# Patient Record
Sex: FEMALE | Race: WHITE | Employment: UNEMPLOYED | URBAN - METROPOLITAN AREA
[De-identification: names, ages, dates, MRNs, and addresses within clinical notes are randomized per-mention and may not be internally consistent; named-entity substitution may affect disease eponyms.]

---

## 2018-01-23 ENCOUNTER — OFFICE VISIT (OUTPATIENT)
Dept: FAMILY MEDICINE CLINIC | Age: 43
End: 2018-01-23

## 2018-01-23 VITALS
HEIGHT: 67 IN | DIASTOLIC BLOOD PRESSURE: 76 MMHG | OXYGEN SATURATION: 100 % | BODY MASS INDEX: 29.66 KG/M2 | TEMPERATURE: 98.5 F | SYSTOLIC BLOOD PRESSURE: 124 MMHG | RESPIRATION RATE: 16 BRPM | WEIGHT: 189 LBS | HEART RATE: 79 BPM

## 2018-01-23 DIAGNOSIS — F41.9 ANXIETY: ICD-10-CM

## 2018-01-23 DIAGNOSIS — R92.8 ABNORMAL MAMMOGRAM: Primary | ICD-10-CM

## 2018-01-23 RX ORDER — SUMATRIPTAN 50 MG/1
50 TABLET, FILM COATED ORAL
COMMUNITY

## 2018-01-23 RX ORDER — MELOXICAM 15 MG/1
15 TABLET ORAL DAILY
COMMUNITY

## 2018-01-23 RX ORDER — BUPROPION HYDROCHLORIDE 150 MG/1
150 TABLET ORAL
Qty: 30 TAB | Refills: 2 | Status: SHIPPED | OUTPATIENT
Start: 2018-01-23

## 2018-01-23 RX ORDER — DICLOFENAC SODIUM 10 MG/G
GEL TOPICAL 4 TIMES DAILY
COMMUNITY

## 2018-01-23 RX ORDER — ESCITALOPRAM OXALATE 20 MG/1
20 TABLET ORAL DAILY
COMMUNITY
End: 2018-01-23 | Stop reason: ALTCHOICE

## 2018-01-23 NOTE — MR AVS SNAPSHOT
[address] [place] [address] Road 
[phone] Patient: Sunshine Tuttle MRN: [id number] :1975 Visit Information Date & Time Provider Department Dept. Phone Encounter #  
 2018 10:30 AM Watson Snider MD [address] [id number] Follow-up Instructions Return in about 2 months (around 3/23/2018). Your Appointments 2018 10:30 AM  
New Patient with Watson Snider MD  
[facility] [address]) Appt Note: Establish Care/Swollen Uvula [facility] [address] [place] [address]  
[phone]  
  
   
 [facility] [address] [place] [address] Upcoming Health Maintenance Date Due DTaP/Tdap/Td series (1 - Tdap) 10/6/1998 PAP AKA CERVICAL CYTOLOGY 10/6/1998 Influenza Age 5 to Adult 2017 Allergies as of 2018  Review Complete On: 2018 By: Watson Snider MD  
 No Known Allergies Current Immunizations  Never Reviewed No immunizations on file. Not reviewed this visit You Were Diagnosed With   
  
 Codes Comments Abnormal mammogram    -  Primary ICD-10-CM: R92.8 ICD-9-CM: 793.80 Anxiety     ICD-10-CM: F41.9 ICD-9-CM: 300.00 Vitals BP Pulse Temp Resp Height(growth percentile) Weight(growth percentile) 124/76 79 98.5 °F (36.9 °C) 16 5' 7\" (1.702 m) 189 lb (85.7 kg) LMP SpO2 BMI OB Status Smoking Status 01/15/2018 100% 29.6 kg/m2 Having regular periods Never Smoker Vitals History BMI and BSA Data Body Mass Index Body Surface Area  
 29.6 kg/m 2 2.01 m 2 Your Updated Medication List  
  
   
This list is accurate as of: 18 10:29 AM.  Always use your most recent med list.  
  
  
  
  
 buPROPion  mg tablet Commonly known as:  Bao Copeland Take 1 Tab by mouth every morning. IMITREX 50 mg tablet Generic drug:  SUMAtriptan Take 50 mg by mouth once as needed for Migraine. meloxicam 15 mg tablet Commonly known as:  MOBIC Take 15 mg by mouth daily. VOLTAREN 1 % Gel Generic drug:  diclofenac Apply  to affected area four (4) times daily. Prescriptions Printed Refills buPROPion XL (WELLBUTRIN XL) 150 mg tablet 2 Sig: Take 1 Tab by mouth every morning. Class: Print Route: Oral  
  
We Performed the Following REFERRAL TO PSYCHIATRY [REF91 Custom] Comments:  
 Please evaluate patient for ADD and anxiety Follow-up Instructions Return in about 2 months (around 3/23/2018). To-Do List   
 01/23/2018 Imaging:  [facility] MAMMO BI DX INCL CAD Referral Information Referral ID Referred By Referred To  
  
 [id number] JUNAID Cyr Not Available Visits Status Start Date End Date 1 New Request 1/23/18 1/23/19 If your referral has a status of pending review or denied, additional information will be sent to support the outcome of this decision. Introducing \[facility]\"" & HEALTH SERVICES! [facility] introduces [employer] patient portal. Now you can access parts of your medical record, email your doctor's office, and request medication refills online. 1. In your internet browser, go to [email or web address] 2. Click on the First Time User? Click Here link in the Sign In box. You will see the New Member Sign Up page. 3. Enter your [employer] Access Code exactly as it appears below. You will not need to use this code after youve completed the sign-up process. If you do not sign up before the expiration date, you must request a new code. · [employer] Access Code: [email or web address] Expires: 4/23/2018  9:46 AM 
 
4. Enter the last four digits of your Social Security Number (xxxx) and Date of Birth (mm/dd/yyyy) as indicated and click Submit. You will be taken to the next sign-up page. 5. Create a Familio ID. This will be your Familio login ID and cannot be changed, so think of one that is secure and easy to remember. 6. Create a Familio password. You can change your password at any time. 7. Enter your Password Reset Question and Answer. This can be used at a later time if you forget your password. 8. Enter your e-mail address. You will receive e-mail notification when new information is available in [address]. 9. Click Sign Up. You can now view and download portions of your medical record. 10. Click the Download Summary menu link to download a portable copy of your medical information. If you have questions, please visit the Frequently Asked Questions section of the [employer] website. Remember, [employer] is NOT to be used for urgent needs. For medical emergencies, dial 911. Now available from your iPhone and Android! Please provide this summary of care documentation to your next provider. Your primary care clinician is listed as [id number] [facility]. If you have any questions after today's visit, please call [phone].

## 2018-01-23 NOTE — PROGRESS NOTES
Lynne Barnes is a 38 y.o. female  presents for establish care. Needs mammogram.    No Known Allergies  Outpatient Prescriptions Marked as Taking for the 1/23/18 encounter (Office Visit) with Chantel Mckeon MD   Medication Sig Dispense Refill    escitalopram oxalate (LEXAPRO) 20 mg tablet Take 20 mg by mouth daily.  SUMAtriptan (IMITREX) 50 mg tablet Take 50 mg by mouth once as needed for Migraine.  diclofenac (VOLTAREN) 1 % gel Apply  to affected area four (4) times daily.  meloxicam (MOBIC) 15 mg tablet Take 15 mg by mouth daily. There is no problem list on file for this patient. Past Medical History:   Diagnosis Date    ADHD     Anxiety     Back pain     Depression     Migraine     Neck pain      Social History     Social History    Marital status:      Spouse name: N/A    Number of children: N/A    Years of education: N/A     Social History Main Topics    Smoking status: Never Smoker    Smokeless tobacco: Never Used    Alcohol use Yes      Comment: 'rarely'    Drug use: No    Sexual activity: Yes     Other Topics Concern    Not on file     Social History Narrative    No narrative on file     Family History   Problem Relation Age of Onset    Sleep Apnea Mother     Heart Disease Mother     High Cholesterol Mother     Muscular dystrophy Father     High Cholesterol Father     Diabetes Other         Review of Systems   Constitutional: Negative for chills and fever. Respiratory: Negative for cough. Cardiovascular: Negative for chest pain and palpitations. Gastrointestinal: Negative for nausea and vomiting. Genitourinary: Negative.       Vitals:    01/23/18 1007   BP: 124/76   Pulse: 79   Resp: 16   Temp: 98.5 °F (36.9 °C)   SpO2: 100%   Weight: 189 lb (85.7 kg)   Height: 5' 7\" (1.702 m)   PainSc:   0 - No pain   LMP: 01/15/2018       Physical Exam   Constitutional: She is oriented to person, place, and time and well-developed, well-nourished, and in no distress. Eyes: Conjunctivae are normal. Pupils are equal, round, and reactive to light. Neck: Normal range of motion. Neck supple. Cardiovascular: Normal rate, regular rhythm and normal heart sounds. Pulmonary/Chest: Effort normal and breath sounds normal.   Musculoskeletal: Normal range of motion. Neurological: She is alert and oriented to person, place, and time. Gait normal.   Skin: Skin is warm and dry. Psychiatric: Mood, memory, affect and judgment normal.   Nursing note and vitals reviewed. Assessment/Plan      ICD-10-CM ICD-9-CM    1. Abnormal mammogram R92.8 793.80 [facility] MAMMO BI DX INCL CAD   2. Anxiety F41.9 300.00 buPROPion XL (WELLBUTRIN XL) 150 mg tablet      REFERRAL TO PSYCHIATRY     I have discussed the diagnosis with the patient and the intended plan of care as seen in the above orders. The patient has received an after-visit summary and questions were answered concerning future plans. I have discussed medication, side effects, and warnings with the patient in detail. The patient verbalized understanding and is in agreement with the plan of care. The patient will contact the office with any additional concerns. Follow-up Disposition:  Return in about 2 months (around 3/23/2018).   lab results and schedule of future lab studies reviewed with patient      Mitra Sandoval MD

## 2018-01-23 NOTE — PROGRESS NOTES
Chief Complaint   Patient presents with   [address]    Other     hx swollen uvula      Pt has had katerin bar virus panel done. Needs referral for mammo.

## 2018-02-08 ENCOUNTER — OFFICE VISIT (OUTPATIENT)
Dept: FAMILY MEDICINE CLINIC | Age: 43
End: 2018-02-08

## 2018-02-08 VITALS
WEIGHT: 187 LBS | OXYGEN SATURATION: 98 % | DIASTOLIC BLOOD PRESSURE: 73 MMHG | SYSTOLIC BLOOD PRESSURE: 124 MMHG | HEART RATE: 97 BPM | HEIGHT: 67 IN | RESPIRATION RATE: 16 BRPM | BODY MASS INDEX: 29.35 KG/M2

## 2018-02-08 DIAGNOSIS — K12.2 UVULITIS: Primary | ICD-10-CM

## 2018-02-08 NOTE — PROGRESS NOTES
Chief Complaint   Patient presents with    Gland Swelling     c/o uvula is still swollen and feels like it's getting worse    Ear Pain     right ear pain      1. Have you been to the ER, urgent care clinic since your last visit? Hospitalized since your last visit? No    2. Have you seen or consulted any other health care providers outside of the [address] [place] [address] since your last visit? Include any pap smears or colon screening.  No

## 2018-02-08 NOTE — PROGRESS NOTES
Tamra Joe is a 38 y.o. female  presents for follow up of feeling like mass is in throat. She has had sxs for few weeks. No change in sxs. No fever or sinus drainage. No Known Allergies  Outpatient Prescriptions Marked as Taking for the 2/8/18 encounter (Office Visit) with Tito Tripathi MD   Medication Sig Dispense Refill    SUMAtriptan (IMITREX) 50 mg tablet Take 50 mg by mouth once as needed for Migraine.  diclofenac (VOLTAREN) 1 % gel Apply  to affected area four (4) times daily.  meloxicam (MOBIC) 15 mg tablet Take 15 mg by mouth daily.  buPROPion XL (WELLBUTRIN XL) 150 mg tablet Take 1 Tab by mouth every morning. 30 Tab 2     There is no problem list on file for this patient. Past Medical History:   Diagnosis Date    ADHD     Anxiety     Back pain     Depression     Migraine     Neck pain      Social History     Social History    Marital status:      Spouse name: N/A    Number of children: N/A    Years of education: N/A     Social History Main Topics    Smoking status: Never Smoker    Smokeless tobacco: Never Used    Alcohol use Yes      Comment: 'rarely'    Drug use: No    Sexual activity: Yes     Other Topics Concern    None     Social History Narrative     Family History   Problem Relation Age of Onset    Sleep Apnea Mother     Heart Disease Mother     High Cholesterol Mother     Muscular dystrophy Father     High Cholesterol Father     Diabetes Other         Review of Systems   Constitutional: Negative for chills and fever. HENT: Negative for congestion, hearing loss and nosebleeds. Respiratory: Negative for cough and shortness of breath. Cardiovascular: Negative for chest pain. Gastrointestinal: Negative for diarrhea, nausea and vomiting. Skin: Negative for rash.      Vitals:    02/08/18 0931   BP: 124/73   Pulse: 97   Resp: 16   SpO2: 98%   Weight: 187 lb (84.8 kg)   Height: 5' 7\" (1.702 m)   LMP: 01/15/2018       Physical Exam Constitutional: She is well-developed, well-nourished, and in no distress. Cardiovascular: Normal rate, regular rhythm and normal heart sounds. Pulmonary/Chest: Effort normal and breath sounds normal.   Neurological: She is alert. Skin: Skin is warm and dry. Nursing note and vitals reviewed. Assessment/Plan      ICD-10-CM ICD-9-CM    1. Uvulitis K12.2 528.3 REFERRAL TO ENT-OTOLARYNGOLOGY     I have discussed the diagnosis with the patient and the intended plan of care as seen in the above orders. The patient has received an after-visit summary and questions were answered concerning future plans. I have discussed medication, side effects, and warnings with the patient in detail. The patient verbalized understanding and is in agreement with the plan of care. The patient will contact the office with any additional concerns. Follow-up Disposition:  Return if symptoms worsen or fail to improve.     Delphine Magallon MD

## 2018-02-08 NOTE — MR AVS SNAPSHOT
[facility] [address] [place] [address] 
[phone] Patient: Ursula Duff MRN: [id number] :1975 Visit Information Date & Time Provider Department Dept. Phone Encounter #  
 2018  9:30 AM Samir Waller MD [address] [id number] Follow-up Instructions Return if symptoms worsen or fail to improve. Upcoming Health Maintenance Date Due  
 PAP AKA CERVICAL CYTOLOGY 10/6/1998 DTaP/Tdap/Td series (2 - Td) 2028 Allergies as of 2018  Review Complete On: 2018 By: Jagdish Chisholm LPN No Known Allergies Current Immunizations  Never Reviewed No immunizations on file. Not reviewed this visit You Were Diagnosed With   
  
 Codes Comments Uvulitis    -  Primary ICD-10-CM: K12.2 ICD-9-CM: 788. 3 Vitals BP Pulse Resp Height(growth percentile) Weight(growth percentile) LMP  
 124/73 97 16 5' 7\" (1.702 m) 187 lb (84.8 kg) 01/15/2018 SpO2 BMI OB Status Smoking Status 98% 29.29 kg/m2 Having regular periods Never Smoker BMI and BSA Data Body Mass Index Body Surface Area  
 29.29 kg/m 2 2 m 2 Your Updated Medication List  
  
   
This list is accurate as of: 18  9:41 AM.  Always use your most recent med list.  
  
  
  
  
 buPROPion  mg tablet Commonly known as:  Gabriela Mast Take 1 Tab by mouth every morning. IMITREX 50 mg tablet Generic drug:  SUMAtriptan Take 50 mg by mouth once as needed for Migraine. meloxicam 15 mg tablet Commonly known as:  MOBIC Take 15 mg by mouth daily. VOLTAREN 1 % Gel Generic drug:  diclofenac Apply  to affected area four (4) times daily. We Performed the Following REFERRAL TO ENT-OTOLARYNGOLOGY [YBR65 Custom] Comments:  
 Please evaluate patient for persistent possible throat mass. Follow-up Instructions Return if symptoms worsen or fail to improve. Referral Information Referral ID Referred By Referred To  
  
 [id number] JUNAID Julian Ma Not Available Visits Status Start Date End Date 1 New Request 2/8/18 2/8/19 If your referral has a status of pending review or denied, additional information will be sent to support the outcome of this decision. Patient Instructions Uvulitis: Care Instructions Your Care Instructions Uvulitis (say \"gaz-bzgo-MT-tus\") is an inflammation of the uvula (say \"QXH-wuqq-nte\"). This is the small piece of finger-shaped tissue that hangs down in the back of the throat. Uvulitis is most often caused by an infection. It can also be a reaction to an allergy or injury. Often the cause is not known. Your uvula may be red and swollen. You may feel like something is stuck at the back of your throat. Sometimes you may have a hard time swallowing. You may have a sore throat or a fever. Home treatment for a sore throat may be all that is needed to relieve uvulitis. If it is caused by an infection, your doctor may give you an antibiotic. Your doctor may prescribe an antihistamine or a steroid medicine if uvulitis is caused by an allergy. It may also go away without treatment. Follow-up care is a key part of your treatment and safety. Be sure to make and go to all appointments, and call your doctor if you are having problems. It's also a good idea to know your test results and keep a list of the medicines you take. How can you care for yourself at home? · Be safe with medicines. Take your medicines exactly as prescribed. Call your doctor if you think you are having a problem with your medicine. You will get more details on the specific medicines your doctor prescribes. · Gargle with warm salt water once an hour to help reduce swelling and relieve pain. Use 1 teaspoon of salt mixed in 1 cup of warm water. · Try an over-the-counter throat spray to relieve throat pain. · Ask your doctor if you can take an over-the-counter pain medicine, such as acetaminophen (Tylenol), ibuprofen (Advil, Motrin), or naproxen (Aleve). Read and follow all instructions on the label. · Drink plenty of fluids. Fluids may help soothe your throat. If you have kidney, heart, or liver disease and have to limit fluids, talk with your doctor before you increase the amount of fluids you drink. · Do not smoke or allow others to smoke around you. Smoking can make your throat problem worse. If you need help quitting, talk to your doctor about stop-smoking programs and medicines. These can increase your chances of quitting for good. When should you call for help? Call your doctor now or seek immediate medical care if: 
? · You have new or worse symptoms of infection, such as: 
¨ Increased pain, swelling, warmth, or redness. ¨ Red streaks leading from the area. ¨ Pus draining from the area. ¨ A fever. ? · You have new pain, or your pain gets worse. ? · You have new or worse trouble swallowing. ? · You seem to be getting sicker. ? · You have shortness of breath. ? Watch closely for changes in your health, and be sure to contact your doctor if: 
? · You do not get better as expected. Where can you learn more? Go to http://kerri-kulwant.info/. Enter M157 in the search box to learn more about \"Uvulitis: Care Instructions. \" Current as of: May 12, 2017 Content Version: 11.4 © 9187-1936 [employer]. Care instructions adapted under license by [employer] [place] (which disclaims liability or warranty for this information). If you have questions about a medical condition or this instruction, always ask your healthcare professional. [address] any warranty or liability for your use of this information. Introducing [facility] & HEALTH SERVICES! Dear Michael Berman: Thank you for requesting a [employer] account. Our records indicate that you already have an active [employer] account. You can access your account anytime at [email or web address] Did you know that you can access your hospital and ER discharge instructions at any time in [employer]? You can also review all of your test results from your hospital stay or ER visit. Additional Information If you have questions, please visit the Frequently Asked Questions section of the [employer] website at [email or web address] Remember, [employer] is NOT to be used for urgent needs. For medical emergencies, dial 911. Now available from your iPhone and Android! Please provide this summary of care documentation to your next provider. Your primary care clinician is listed as [address]. If you have any questions after today's visit, please call [phone].

## 2018-04-20 ENCOUNTER — HOSPITAL ENCOUNTER (OUTPATIENT)
Dept: MAMMOGRAPHY | Age: 43
Discharge: HOME | End: 2018-04-20
Attending: FAMILY MEDICINE
Payer: COMMERCIAL

## 2018-04-20 ENCOUNTER — HOSPITAL ENCOUNTER (OUTPATIENT)
Dept: ULTRASOUND IMAGING | Age: 43
Discharge: HOME | End: 2018-04-20
Attending: FAMILY MEDICINE
Payer: COMMERCIAL

## 2018-04-20 DIAGNOSIS — N63.0 BREAST LUMP: ICD-10-CM

## 2018-04-20 DIAGNOSIS — N64.4 BREAST PAIN: ICD-10-CM

## 2018-04-20 DIAGNOSIS — R92.8 ABNORMAL MAMMOGRAM: ICD-10-CM

## 2018-04-20 PROCEDURE — 77062 BREAST TOMOSYNTHESIS BI: CPT

## 2018-04-20 PROCEDURE — 76642 ULTRASOUND BREAST LIMITED: CPT

## 2018-05-07 ENCOUNTER — TELEPHONE (OUTPATIENT)
Dept: FAMILY MEDICINE CLINIC | Age: 43
End: 2018-05-07

## 2018-05-07 NOTE — TELEPHONE ENCOUNTER
Notes Recorded by Osbaldo Hill MD on 4/24/2018 at 10:29 AM  mammo no cancer.  Does have lipoma left breast.      Pt received result letter in mail. She was given above message. She asked how often she should have her mammo done. I let her now per the note:  Recommend routine annual screening mammogram  -The patient was asked to examine the area of concern on a monthly basis and to  return sooner if any changes occur. Pt aware to call if she begins to experience any breast changes or have any concerns. Pt expressed clear understanding and had no further questions.

## 2018-06-09 ENCOUNTER — HOSPITAL ENCOUNTER (EMERGENCY)
Age: 43
Discharge: HOME | End: 2018-06-09
Attending: EMERGENCY MEDICINE
Payer: COMMERCIAL

## 2018-06-09 ENCOUNTER — APPOINTMENT (OUTPATIENT)
Dept: CT IMAGING | Age: 43
End: 2018-06-09
Attending: EMERGENCY MEDICINE
Payer: COMMERCIAL

## 2018-06-09 VITALS
HEIGHT: 66 IN | RESPIRATION RATE: 20 BRPM | DIASTOLIC BLOOD PRESSURE: 88 MMHG | HEART RATE: 67 BPM | OXYGEN SATURATION: 98 % | BODY MASS INDEX: 28.93 KG/M2 | TEMPERATURE: 98.3 F | WEIGHT: 180 LBS | SYSTOLIC BLOOD PRESSURE: 139 MMHG

## 2018-06-09 DIAGNOSIS — N20.0 KIDNEY STONE: ICD-10-CM

## 2018-06-09 DIAGNOSIS — R30.0 DYSURIA: ICD-10-CM

## 2018-06-09 DIAGNOSIS — R10.9 ACUTE RIGHT FLANK PAIN: Primary | ICD-10-CM

## 2018-06-09 LAB
ANION GAP SERPL CALC-SCNC: 5 MMOL/L (ref 3–18)
APPEARANCE UR: CLEAR
BACTERIA URNS QL MICRO: ABNORMAL /HPF
BASOPHILS # BLD: 0 K/UL (ref 0–0.06)
BASOPHILS NFR BLD: 1 % (ref 0–2)
BILIRUB UR QL: ABNORMAL
BUN SERPL-MCNC: 16 MG/DL (ref 7–18)
BUN/CREAT SERPL: 17 (ref 12–20)
CALCIUM SERPL-MCNC: 9.2 MG/DL (ref 8.5–10.1)
CHLORIDE SERPL-SCNC: 103 MMOL/L (ref 100–108)
CO2 SERPL-SCNC: 29 MMOL/L (ref 21–32)
COLOR UR: ABNORMAL
CREAT SERPL-MCNC: 0.93 MG/DL (ref 0.6–1.3)
DIFFERENTIAL METHOD BLD: ABNORMAL
EOSINOPHIL # BLD: 0.1 K/UL (ref 0–0.4)
EOSINOPHIL NFR BLD: 2 % (ref 0–5)
EPITH CASTS URNS QL MICRO: ABNORMAL /LPF (ref 0–5)
ERYTHROCYTE [DISTWIDTH] IN BLOOD BY AUTOMATED COUNT: 12.8 % (ref 11.6–14.5)
GLUCOSE SERPL-MCNC: 96 MG/DL (ref 74–99)
GLUCOSE UR STRIP.AUTO-MCNC: NEGATIVE MG/DL
HCG UR QL: NEGATIVE
HCT VFR BLD AUTO: 37.1 % (ref 35–45)
HGB BLD-MCNC: 12.3 G/DL (ref 12–16)
HGB UR QL STRIP: ABNORMAL
KETONES UR QL STRIP.AUTO: NEGATIVE MG/DL
LEUKOCYTE ESTERASE UR QL STRIP.AUTO: ABNORMAL
LYMPHOCYTES # BLD: 1.7 K/UL (ref 0.9–3.6)
LYMPHOCYTES NFR BLD: 31 % (ref 21–52)
MCH RBC QN AUTO: 29.6 PG (ref 24–34)
MCHC RBC AUTO-ENTMCNC: 33.2 G/DL (ref 31–37)
MCV RBC AUTO: 89.2 FL (ref 74–97)
MONOCYTES # BLD: 0.4 K/UL (ref 0.05–1.2)
MONOCYTES NFR BLD: 8 % (ref 3–10)
MUCOUS THREADS URNS QL MICRO: ABNORMAL /LPF
NEUTS SEG # BLD: 3.1 K/UL (ref 1.8–8)
NEUTS SEG NFR BLD: 58 % (ref 40–73)
NITRITE UR QL STRIP.AUTO: POSITIVE
PH UR STRIP: 6.5 [PH] (ref 5–8)
PLATELET # BLD AUTO: 264 K/UL (ref 135–420)
PMV BLD AUTO: 10.3 FL (ref 9.2–11.8)
POTASSIUM SERPL-SCNC: 4.2 MMOL/L (ref 3.5–5.5)
PROT UR STRIP-MCNC: ABNORMAL MG/DL
RBC # BLD AUTO: 4.16 M/UL (ref 4.2–5.3)
RBC #/AREA URNS HPF: ABNORMAL /HPF (ref 0–5)
SODIUM SERPL-SCNC: 137 MMOL/L (ref 136–145)
SP GR UR REFRACTOMETRY: 1.03 (ref 1–1.03)
UROBILINOGEN UR QL STRIP.AUTO: 1 EU/DL (ref 0.2–1)
WBC # BLD AUTO: 5.4 K/UL (ref 4.6–13.2)
WBC URNS QL MICRO: ABNORMAL /HPF (ref 0–4)

## 2018-06-09 PROCEDURE — 96375 TX/PRO/DX INJ NEW DRUG ADDON: CPT

## 2018-06-09 PROCEDURE — 74176 CT ABD & PELVIS W/O CONTRAST: CPT

## 2018-06-09 PROCEDURE — 87086 URINE CULTURE/COLONY COUNT: CPT | Performed by: EMERGENCY MEDICINE

## 2018-06-09 PROCEDURE — 80048 BASIC METABOLIC PNL TOTAL CA: CPT | Performed by: EMERGENCY MEDICINE

## 2018-06-09 PROCEDURE — 74011250636 HC RX REV CODE- 250/636: Performed by: EMERGENCY MEDICINE

## 2018-06-09 PROCEDURE — 96374 THER/PROPH/DIAG INJ IV PUSH: CPT

## 2018-06-09 PROCEDURE — 99283 EMERGENCY DEPT VISIT LOW MDM: CPT

## 2018-06-09 PROCEDURE — 81025 URINE PREGNANCY TEST: CPT | Performed by: EMERGENCY MEDICINE

## 2018-06-09 PROCEDURE — 96361 HYDRATE IV INFUSION ADD-ON: CPT

## 2018-06-09 PROCEDURE — 74011000250 HC RX REV CODE- 250: Performed by: EMERGENCY MEDICINE

## 2018-06-09 PROCEDURE — 81001 URINALYSIS AUTO W/SCOPE: CPT | Performed by: EMERGENCY MEDICINE

## 2018-06-09 PROCEDURE — 85025 COMPLETE CBC W/AUTO DIFF WBC: CPT | Performed by: EMERGENCY MEDICINE

## 2018-06-09 RX ORDER — SULFAMETHOXAZOLE AND TRIMETHOPRIM 800; 160 MG/1; MG/1
1 TABLET ORAL 2 TIMES DAILY
Qty: 14 TAB | Refills: 0 | Status: SHIPPED | OUTPATIENT
Start: 2018-06-09 | End: 2018-06-16

## 2018-06-09 RX ORDER — PHENAZOPYRIDINE HYDROCHLORIDE 100 MG/1
100 TABLET, FILM COATED ORAL
Qty: 6 TAB | Refills: 0 | Status: SHIPPED | OUTPATIENT
Start: 2018-06-09 | End: 2018-06-11

## 2018-06-09 RX ORDER — KETOROLAC TROMETHAMINE 30 MG/ML
30 INJECTION, SOLUTION INTRAMUSCULAR; INTRAVENOUS
Status: COMPLETED | OUTPATIENT
Start: 2018-06-09 | End: 2018-06-09

## 2018-06-09 RX ADMIN — KETOROLAC TROMETHAMINE 30 MG: 30 INJECTION INTRAMUSCULAR; INTRAVENOUS at 11:12

## 2018-06-09 RX ADMIN — WATER 1 G: 1 INJECTION INTRAMUSCULAR; INTRAVENOUS; SUBCUTANEOUS at 11:16

## 2018-06-09 RX ADMIN — SODIUM CHLORIDE 500 ML: 900 INJECTION, SOLUTION INTRAVENOUS at 11:06

## 2018-06-09 NOTE — ED NOTES
Pt states she feels better.  What appeared to be a small stone was noted in her urine that obtained and sent to the lab

## 2018-06-09 NOTE — ED NOTES
Patient armband removed and shredded  Pt discharged home with after care instructions given to pt . Pt verbalizes understand of after care instructions. Return to the ED for any worsening symptoms and follow with primary care doctor as directed. IV removed site without redness or swelling.    Ambrocio Mae RN

## 2018-06-09 NOTE — DISCHARGE INSTRUCTIONS
Kidney Stone: Care Instructions  Your Care Instructions    Kidney stones are formed when salts, minerals, and other substances normally found in the urine clump together. They can be as small as grains of sand or, rarely, as large as golf balls. While the stone is traveling through the ureter, which is the tube that carries urine from the kidney to the bladder, you will probably feel pain. The pain may be mild or very severe. You may also have some blood in your urine. As soon as the stone reaches the bladder, any intense pain should go away. If a stone is too large to pass on its own, you may need a medical procedure to help you pass the stone. The doctor has checked you carefully, but problems can develop later. If you notice any problems or new symptoms, get medical treatment right away. Follow-up care is a key part of your treatment and safety. Be sure to make and go to all appointments, and call your doctor if you are having problems. It's also a good idea to know your test results and keep a list of the medicines you take. How can you care for yourself at home? · Drink plenty of fluids, enough so that your urine is light yellow or clear like water. If you have kidney, heart, or liver disease and have to limit fluids, talk with your doctor before you increase the amount of fluids you drink. · Take pain medicines exactly as directed. Call your doctor if you think you are having a problem with your medicine. ¨ If the doctor gave you a prescription medicine for pain, take it as prescribed. ¨ If you are not taking a prescription pain medicine, ask your doctor if you can take an over-the-counter medicine. Read and follow all instructions on the label. · Your doctor may ask you to strain your urine so that you can collect your kidney stone when it passes. You can use a kitchen strainer or a tea strainer to catch the stone. Store it in a plastic bag until you see your doctor again.   Preventing future kidney stones  Some changes in your diet may help prevent kidney stones. Depending on the cause of your stones, your doctor may recommend that you:  · Drink plenty of fluids, enough so that your urine is light yellow or clear like water. If you have kidney, heart, or liver disease and have to limit fluids, talk with your doctor before you increase the amount of fluids you drink. · Limit coffee, tea, and alcohol. Also avoid grapefruit juice. · Do not take more than the recommended daily dose of vitamins C and D.  · Avoid antacids such as Gaviscon, Maalox, Mylanta, or Tums. · Limit the amount of salt (sodium) in your diet. · Eat a balanced diet that is not too high in protein. · Limit foods that are high in a substance called oxalate, which can cause kidney stones. These foods include dark green vegetables, rhubarb, chocolate, wheat bran, nuts, cranberries, and beans. When should you call for help? Call your doctor now or seek immediate medical care if:  ? · You cannot keep down fluids. ? · Your pain gets worse. ? · You have a fever or chills. ? · You have new or worse pain in your back just below your rib cage (the flank area). ? · You have new or more blood in your urine. ? Watch closely for changes in your health, and be sure to contact your doctor if:  ? · You do not get better as expected. Where can you learn more? Go to http://kerri-kulwant.info/. Enter V753 in the search box to learn more about \"Kidney Stone: Care Instructions. \"  Current as of: May 12, 2017  Content Version: 11.4  © 9433-1472 [employer]. Care instructions adapted under license by [employer] (which disclaims liability or warranty for this information). If you have questions about a medical condition or this instruction, always ask your healthcare professional. [address] any warranty or liability for your use of this information.          Abdominal Pain: Care Instructions  Your Care Instructions    Abdominal pain has many possible causes. Some aren't serious and get better on their own in a few days. Others need more testing and treatment. If your pain continues or gets worse, you need to be rechecked and may need more tests to find out what is wrong. You may need surgery to correct the problem. Don't ignore new symptoms, such as fever, nausea and vomiting, urination problems, pain that gets worse, and dizziness. These may be signs of a more serious problem. Your doctor may have recommended a follow-up visit in the next 8 to 12 hours. If you are not getting better, you may need more tests or treatment. The doctor has checked you carefully, but problems can develop later. If you notice any problems or new symptoms, get medical treatment right away. Follow-up care is a key part of your treatment and safety. Be sure to make and go to all appointments, and call your doctor if you are having problems. It's also a good idea to know your test results and keep a list of the medicines you take. How can you care for yourself at home? · Rest until you feel better. · To prevent dehydration, drink plenty of fluids, enough so that your urine is light yellow or clear like water. Choose water and other caffeine-free clear liquids until you feel better. If you have kidney, heart, or liver disease and have to limit fluids, talk with your doctor before you increase the amount of fluids you drink. · If your stomach is upset, eat mild foods, such as rice, dry toast or crackers, bananas, and applesauce. Try eating several small meals instead of two or three large ones. · Wait until 48 hours after all symptoms have gone away before you have spicy foods, alcohol, and drinks that contain caffeine. · Do not eat foods that are high in fat. · Avoid anti-inflammatory medicines such as aspirin, ibuprofen (Advil, Motrin), and naproxen (Aleve). These can cause stomach upset.  Talk to your doctor if you take daily aspirin for another health problem. When should you call for help? Call 911 anytime you think you may need emergency care. For example, call if:  ? · You passed out (lost consciousness). ? · You pass maroon or very bloody stools. ? · You vomit blood or what looks like coffee grounds. ? · You have new, severe belly pain. ?Call your doctor now or seek immediate medical care if:  ? · Your pain gets worse, especially if it becomes focused in one area of your belly. ? · You have a new or higher fever. ? · Your stools are black and look like tar, or they have streaks of blood. ? · You have unexpected vaginal bleeding. ? · You have symptoms of a urinary tract infection. These may include:  ¨ Pain when you urinate. ¨ Urinating more often than usual.  ¨ Blood in your urine. ? · You are dizzy or lightheaded, or you feel like you may faint. ? Watch closely for changes in your health, and be sure to contact your doctor if:  ? · You are not getting better after 1 day (24 hours). Where can you learn more? Go to http://kerri-kulwant.info/. Enter L907 in the search box to learn more about \"Abdominal Pain: Care Instructions. \"  Current as of: March 20, 2017  Content Version: 11.4  © 8841-4917 [employer]. Care instructions adapted under license by [employer] (which disclaims liability or warranty for this information). If you have questions about a medical condition or this instruction, always ask your healthcare professional. [place] [address] any warranty or liability for your use of this information. Painful Urination (Dysuria): Care Instructions  Your Care Instructions  Burning pain with urination (dysuria) is a common symptom of a urinary tract infection or other urinary problems. The bladder may become inflamed. This can cause pain when the bladder fills and empties.  You may also feel pain if the tube that carries urine from the bladder to the outside of the body (urethra) gets irritated or infected. Sexually transmitted infections (STIs) also may cause pain when you urinate. Sometimes the pain can be caused by things other than an infection. The urethra can be irritated by soaps, perfumes, or foreign objects in the urethra. Kidney stones can cause pain when they pass through the urethra. The cause may be hard to find. You may need tests. Treatment for painful urination depends on the cause. Follow-up care is a key part of your treatment and safety. Be sure to make and go to all appointments, and call your doctor if you are having problems. It's also a good idea to know your test results and keep a list of the medicines you take. How can you care for yourself at home? · Drink extra water for the next day or two. This will help make the urine less concentrated. (If you have kidney, heart, or liver disease and have to limit fluids, talk with your doctor before you increase the amount of fluids you drink.)  · Avoid drinks that are carbonated or have caffeine. They can irritate the bladder. · Urinate often. Try to empty your bladder each time. For women:  · Urinate right after you have sex. · After going to the bathroom, wipe from front to back. · Avoid douches, bubble baths, and feminine hygiene sprays. And avoid other feminine hygiene products that have deodorants. When should you call for help? Call your doctor now or seek immediate medical care if:  ? · You have new symptoms, such as fever, nausea, or vomiting. ? · You have new or worse symptoms of a urinary problem. For example:  ¨ You have blood or pus in your urine. ¨ You have chills or body aches. ¨ It hurts worse to urinate. ¨ You have groin or belly pain. ¨ You have pain in your back just below your rib cage (the flank area). ? Watch closely for changes in your health, and be sure to contact your doctor if you have any problems.   Where can you learn more? Go to http://kerri-kulwant.info/. Enter C353 in the search box to learn more about \"Painful Urination (Dysuria): Care Instructions. \"  Current as of: May 12, 2017  Content Version: 11.4  © 5479-5579 [employer]. Care instructions adapted under license by [employer] (which disclaims liability or warranty for this information). If you have questions about a medical condition or this instruction, always ask your healthcare professional. [address] any warranty or liability for your use of this information.

## 2018-06-09 NOTE — ED TRIAGE NOTES
Pt states she thinks she may have a UTI or a kidney stone. Woke up this morning with urgency, but could not urinate. Now c/o right flank pain,. Hx of kidney sones 15 years ago.  Drove herself here

## 2018-06-09 NOTE — ED PROVIDER NOTES
EMERGENCY DEPARTMENT HISTORY AND PHYSICAL EXAM    10:38 AM      Date: 6/9/2018  Patient Name: Sigifredo Wolfe    History of Presenting Illness     Chief Complaint   Patient presents with    Flank Pain    Urinary Frequency         History Provided By: Patient    Chief Complaint: Flank pain   Duration: 3 Hours  Timing:  Improving  Location: Right  Quality: Aching  Severity: 6 out of 10  Modifying Factors: hx of kidney stones. AZO  Associated Symptoms: urinary frequency and urgency. Denies fever, emesis, or LE edema      Additional History (Context): Sigifredo Wolfe is a 38 y.o. female with hx of appendectomy and kidney stones who presents with c/o improving 6/10 aching right flank pain onset 3 hours. Pt states she has hx of kidney stones (1 episode that she passed) as well as a FHx of kidney stones. Denies having previous imaging. Pt states she woke up this morning with urinary frequency and urgency but was unable to void without difficulty. Denies fever, emesis, or LE edema. Pt states taking AZO PTA. Denies any known drug allergies. LNMP 4-5 weeks ago but states she has been very active recently with increased running. Pt drove herself to ED today.     PCP: Osbaldo Hill MD      Past History     Past Medical History:  Past Medical History:   Diagnosis Date    ADHD     Anxiety     Back pain     Depression     Migraine     Neck pain        Past Surgical History:  Past Surgical History:   Procedure Laterality Date    HX APPENDECTOMY      HX HERNIA REPAIR      no mesh    HX OTHER SURGICAL      fatty tumors        Family History:  Family History   Problem Relation Age of Onset    Sleep Apnea Mother     Heart Disease Mother     High Cholesterol Mother     Muscular dystrophy Father     High Cholesterol Father     Diabetes Other        Social History:  Social History   Substance Use Topics    Smoking status: Never Smoker    Smokeless tobacco: Never Used    Alcohol use Yes      Comment: 'rarely' Allergies:  No Known Allergies      Review of Systems       Review of Systems   Constitutional: Negative for fever. HENT: Negative for sore throat. Eyes: Negative for redness. Respiratory: Negative for shortness of breath and wheezing. Cardiovascular: Negative for leg swelling. Gastrointestinal: Positive for abdominal pain. Negative for nausea and vomiting. Genitourinary: Positive for difficulty urinating, dysuria, flank pain, frequency and urgency. Musculoskeletal: Negative for neck stiffness. Skin: Negative for pallor. Neurological: Negative for headaches. Hematological: Does not bruise/bleed easily. All other systems reviewed and are negative. Physical Exam     Visit Vitals    /88 (BP 1 Location: Left arm)    Pulse 67    Temp 98.3 °F (36.8 °C)    Resp 20    Ht 5' 6\" (1.676 m)    Wt 81.6 kg (180 lb)    LMP 05/03/2018    SpO2 98%    BMI 29.05 kg/m2         Physical Exam   Constitutional: She is oriented to person, place, and time. She appears well-developed and well-nourished. No distress. HENT:   Head: Normocephalic and atraumatic. Mouth/Throat: Oropharynx is clear and moist.   Eyes: Conjunctivae are normal. Pupils are equal, round, and reactive to light. No scleral icterus. Neck: Normal range of motion. Neck supple. Cardiovascular: Intact distal pulses. Capillary refill < 3 seconds   Pulmonary/Chest: Effort normal and breath sounds normal. No respiratory distress. She has no wheezes. Abdominal: Soft. Bowel sounds are normal. She exhibits no distension. There is tenderness (flank and right side abdominal. suprapubic discomfort). Musculoskeletal: Normal range of motion. She exhibits no edema. Lymphadenopathy:     She has no cervical adenopathy. Neurological: She is alert and oriented to person, place, and time. No cranial nerve deficit. Skin: Skin is warm and dry. She is not diaphoretic. Nursing note and vitals reviewed.         Diagnostic Study Results     Labs -  Recent Results (from the past 12 hour(s))   URINALYSIS W/ RFLX MICROSCOPIC    Collection Time: 06/09/18  9:45 AM   Result Value Ref Range    Color DARK YELLOW      Appearance CLEAR      Specific gravity 1.026 1.005 - 1.030      pH (UA) 6.5 5.0 - 8.0      Protein TRACE (A) NEG mg/dL    Glucose NEGATIVE  NEG mg/dL    Ketone NEGATIVE  NEG mg/dL    Bilirubin SMALL (A) NEG      Blood LARGE (A) NEG      Urobilinogen 1.0 0.2 - 1.0 EU/dL    Nitrites POSITIVE (A) NEG      Leukocyte Esterase SMALL (A) NEG     HCG URINE, QL    Collection Time: 06/09/18  9:45 AM   Result Value Ref Range    HCG urine, QL NEGATIVE  NEG     URINE MICROSCOPIC ONLY    Collection Time: 06/09/18  9:45 AM   Result Value Ref Range    WBC 4 to 10 0 - 4 /hpf    RBC TOO NUMEROUS TO COUNT 0 - 5 /hpf    Epithelial cells 3+ 0 - 5 /lpf    Bacteria 3+ (A) NEG /hpf    Mucus 2+ (A) NEG /lpf   CBC WITH AUTOMATED DIFF    Collection Time: 06/09/18 11:10 AM   Result Value Ref Range    WBC 5.4 4.6 - 13.2 K/uL    RBC 4.16 (L) 4.20 - 5.30 M/uL    HGB 12.3 12.0 - 16.0 g/dL    HCT 37.1 35.0 - 45.0 %    MCV 89.2 74.0 - 97.0 FL    MCH 29.6 24.0 - 34.0 PG    MCHC 33.2 31.0 - 37.0 g/dL    RDW 12.8 11.6 - 14.5 %    PLATELET 870 249 - 801 K/uL    MPV 10.3 9.2 - 11.8 FL    NEUTROPHILS 58 40 - 73 %    LYMPHOCYTES 31 21 - 52 %    MONOCYTES 8 3 - 10 %    EOSINOPHILS 2 0 - 5 %    BASOPHILS 1 0 - 2 %    ABS. NEUTROPHILS 3.1 1.8 - 8.0 K/UL    ABS. LYMPHOCYTES 1.7 0.9 - 3.6 K/UL    ABS. MONOCYTES 0.4 0.05 - 1.2 K/UL    ABS. EOSINOPHILS 0.1 0.0 - 0.4 K/UL    ABS.  BASOPHILS 0.0 0.0 - 0.06 K/UL    DF AUTOMATED     METABOLIC PANEL, BASIC    Collection Time: 06/09/18 11:10 AM   Result Value Ref Range    Sodium 137 136 - 145 mmol/L    Potassium 4.2 3.5 - 5.5 mmol/L    Chloride 103 100 - 108 mmol/L    CO2 29 21 - 32 mmol/L    Anion gap 5 3.0 - 18 mmol/L    Glucose 96 74 - 99 mg/dL    BUN 16 7.0 - 18 MG/DL    Creatinine 0.93 0.6 - 1.3 MG/DL    BUN/Creatinine ratio 17 12 - 20      GFR est AA >60 >60 ml/min/1.73m2    GFR est non-AA >60 >60 ml/min/1.73m2    Calcium 9.2 8.5 - 10.1 MG/DL       Radiologic Studies -   CT ABD PELV WO CONT   Final Result   IMPRESSION:      1. Nonobstructing bilateral nephrolithiasis. -Mild urothelial thickening at the right ureter, which could potentially  represent a nonspecific infectious/inflammatory process, but could represent  sequela of recently passed stone. No ureteral or bladder stones seen on current  exam.     2. Questionable mild mural thickening of the transverse colon and  descending/sigmoid colon versus artifact from under distention (favored).                Medical Decision Making   I am the first provider for this patient. I reviewed the vital signs, available nursing notes, past medical history, past surgical history, family history and social history. Vital Signs-Reviewed the patient's vital signs. Pulse Oximetry Analysis -  98% on room air (Interpretation) Normal    Cardiac Monitor:  Rate: 67 bpm  Rhythm:  Normal Sinus Rhythm       Records Reviewed: Nursing Notes (Time of Review: 10:06 AM)    Provider Notes (Medical Decision Making):  MDM  Number of Diagnoses or Management Options  Acute right flank pain:   Dysuria:   Kidney stone:   Diagnosis management comments: DDX: Kidney stone, Musculoskeletal, UTI, Kidney infection, Appendicitis.           Amount and/or Complexity of Data Reviewed  Clinical lab tests: ordered and reviewed  Tests in the radiology section of CPT®: ordered and reviewed  Tests in the medicine section of CPT®: ordered and reviewed    Patient Progress  Patient progress: resolved      Medications   ketorolac (TORADOL) injection 30 mg (30 mg IntraVENous Given 6/9/18 1112)   sodium chloride 0.9 % bolus infusion 500 mL (0 mL IntraVENous IV Completed 6/9/18 1150)   cefTRIAXone (ROCEPHIN) 1 g in sterile water (preservative free) 10 mL IV syringe (1 g IntraVENous Given 6/9/18 1116)     Pt had urinated here and a stone seen in the cup. WBC wnl  Cr wnl    I have reassessed the patient. I have discussed the workup, results and plan with the patient and patient is in agreement. Patient is feeling better. Patient will be prescribed pyridium, bactrim. Patient was discharge in stable condition. Patient was given outpatient follow up. Patient is to return to emergency department if any new or worsening condition. Diagnosis     Clinical Impression:   1. Acute right flank pain    2. Kidney stone    3. Dysuria        Disposition: Discharge    Follow-up Information     Follow up With Details Comments Patti Hooker MD Call in 3 days  [address]  [phone]      [facility] EMERGENCY DEPT   [address] [facility]  [phone]           Patient's Medications   Start Taking    PHENAZOPYRIDINE (PYRIDIUM) 100 MG TABLET    Take 1 Tab by mouth three (3) times daily (after meals) for 2 days. Indications: Dysuria    TRIMETHOPRIM-SULFAMETHOXAZOLE (BACTRIM DS) 160-800 MG PER TABLET    Take 1 Tab by mouth two (2) times a day for 7 days. Continue Taking    BUPROPION XL (WELLBUTRIN XL) 150 MG TABLET    Take 1 Tab by mouth every morning. DICLOFENAC (VOLTAREN) 1 % GEL    Apply  to affected area four (4) times daily. MELOXICAM (MOBIC) 15 MG TABLET    Take 15 mg by mouth daily. SUMATRIPTAN (IMITREX) 50 MG TABLET    Take 50 mg by mouth once as needed for Migraine.    These Medications have changed    No medications on file   Stop Taking    No medications on file     _______________________________    Attestations:  [id number] Deisy Zhu acting as a scribe for and in the presence of Ambar Jaramillo DO      June 09, 2018 at 11:43 AM       Provider Attestation:      I personally performed the services described in the documentation, reviewed the documentation, as recorded by the scribe in my presence, and it accurately and completely records my words and actions.  June 09, 2018 at 11:43 ROMELIA - Ray Estrada,     _______________________________

## 2018-06-11 LAB
BACTERIA SPEC CULT: NORMAL
SERVICE CMNT-IMP: NORMAL

## 2018-07-25 ENCOUNTER — OFFICE VISIT (OUTPATIENT)
Dept: FAMILY MEDICINE CLINIC | Age: 43
End: 2018-07-25

## 2018-07-25 VITALS
TEMPERATURE: 98 F | OXYGEN SATURATION: 98 % | HEIGHT: 66 IN | BODY MASS INDEX: 29.09 KG/M2 | DIASTOLIC BLOOD PRESSURE: 70 MMHG | SYSTOLIC BLOOD PRESSURE: 128 MMHG | WEIGHT: 181 LBS | RESPIRATION RATE: 15 BRPM | HEART RATE: 78 BPM

## 2018-07-25 DIAGNOSIS — M54.2 NECK PAIN: Primary | ICD-10-CM

## 2018-07-25 DIAGNOSIS — M62.838 MUSCLE SPASM: ICD-10-CM

## 2018-07-25 RX ORDER — PREDNISONE 10 MG/1
TABLET ORAL
Qty: 21 TAB | Refills: 0 | Status: SHIPPED | OUTPATIENT
Start: 2018-07-25

## 2018-07-25 RX ORDER — METHOCARBAMOL 500 MG/1
500 TABLET, FILM COATED ORAL 4 TIMES DAILY
Qty: 40 TAB | Refills: 1 | Status: SHIPPED | OUTPATIENT
Start: 2018-07-25

## 2018-07-25 NOTE — PROGRESS NOTES
Chief Complaint   Patient presents with    Neck Pain     pain began sunday morning on left side of neck and shoulder    Shoulder Pain     1. Have you been to the ER, urgent care clinic since your last visit? Hospitalized since your last visit? No    2. Have you seen or consulted any other health care providers outside of the [address] [place] [address] since your last visit? Include any pap smears or colon screening.  No

## 2018-07-25 NOTE — PATIENT INSTRUCTIONS
Neck Spasm: Exercises  Your Care Instructions  Here are some examples of typical rehabilitation exercises for your condition. Start each exercise slowly. Ease off the exercise if you start to have pain. Your doctor or physical therapist will tell you when you can start these exercises and which ones will work best for you. How to do the exercises  Levator scapula stretch    1. Sit in a firm chair, or stand up straight. 2. Gently tilt your head toward your left shoulder. 3. Turn your head to look down into your armpit, bending your head slightly forward. Let the weight of your head stretch your neck muscles. 4. Hold for 15 to 30 seconds. 5. Return to your starting position. 6. Follow the same instructions above, but tilt your head toward your right shoulder. 7. Repeat 2 to 4 times toward each shoulder. Upper trapezius stretch    1. Sit in a firm chair, or stand up straight. 2. This stretch works best if you keep your shoulder down as you lean away from it. To help you remember to do this, start by relaxing your shoulders and lightly holding on to your thighs or your chair. 3. Tilt your head toward your shoulder and hold for 15 to 30 seconds. Let the weight of your head stretch your muscles. 4. If you would like a little added stretch, place your arm behind your back. Use the arm opposite of the direction you are tilting your head. For example, if you are tilting your head to the left, place your right arm behind your back. 5. Repeat 2 to 4 times toward each shoulder. Neck rotation    1. Sit in a firm chair, or stand up straight. 2. Keeping your chin level, turn your head to the right, and hold for 15 to 30 seconds. 3. Turn your head to the left, and hold for 15 to 30 seconds. 4. Repeat 2 to 4 times to each side. Chin tuck    1. Lie on the floor with a rolled-up towel under your neck. Your head should be touching the floor. 2. Slowly bring your chin toward the front of your neck.   3. Hold for a count of 6, and then relax for up to 10 seconds. 4. Repeat 8 to 12 times. Forward neck flexion    1. Sit in a firm chair, or stand up straight. 2. Bend your head forward. 3. Hold for 15 to 30 seconds, then return to your starting position. 4. Repeat 2 to 4 times. Follow-up care is a key part of your treatment and safety. Be sure to make and go to all appointments, and call your doctor if you are having problems. It's also a good idea to know your test results and keep a list of the medicines you take. Where can you learn more? Go to http://kerri-kulwant.info/. Enter P962 in the search box to learn more about \"Neck Spasm: Exercises. \"  Current as of: November 29, 2017  Content Version: 11.7  © 2200-8054 [employer]. Care instructions adapted under license by [employer] (which disclaims liability or warranty for this information). If you have questions about a medical condition or this instruction, always ask your healthcare professional. [address] any warranty or liability for your use of this information.

## 2018-07-25 NOTE — PROGRESS NOTES
Corwin Palacio is a 38 y.o. female  presents for left sided neck and shoulder pain. She has had sxs for 3 days. She has had this in past and has had CT scan. No new triggers of pain. No weakness or numbness. No Known Allergies  Outpatient Prescriptions Marked as Taking for the 7/25/18 encounter (Office Visit) with Fidelia Degroot MD   Medication Sig Dispense Refill    diclofenac (VOLTAREN) 1 % gel Apply  to affected area four (4) times daily. There is no problem list on file for this patient. Past Medical History:   Diagnosis Date    ADHD     Anxiety     Back pain     Depression     Migraine     Neck pain      Social History     Social History    Marital status:      Spouse name: N/A    Number of children: N/A    Years of education: N/A     Social History Main Topics    Smoking status: Never Smoker    Smokeless tobacco: Never Used    Alcohol use Yes      Comment: 'rarely'    Drug use: No    Sexual activity: Yes     Other Topics Concern    None     Social History Narrative     Family History   Problem Relation Age of Onset    Sleep Apnea Mother     Heart Disease Mother     High Cholesterol Mother     Muscular dystrophy Father     High Cholesterol Father     Diabetes Other         Review of Systems   Constitutional: Negative for chills and fever. Gastrointestinal: Negative for nausea and vomiting. Musculoskeletal: Positive for joint pain and neck pain. Vitals:    07/25/18 1048   BP: 128/70   Pulse: 78   Resp: 15   Temp: 98 °F (36.7 °C)   SpO2: 98%   Weight: 181 lb (82.1 kg)   Height: 5' 6\" (1.676 m)   PainSc:  10 - Worst pain ever       Physical Exam   Constitutional: She is well-developed, well-nourished, and in no distress. Cardiovascular: Normal rate, regular rhythm and normal heart sounds. Pulmonary/Chest: Effort normal and breath sounds normal.   Musculoskeletal: She exhibits tenderness (decreased ROM rotation of neck. ). She exhibits no edema. Neurological: She is alert. Nursing note and vitals reviewed. Assessment/Plan      ICD-10-CM ICD-9-CM    1. Neck pain M54.2 723.1 predniSONE (STERAPRED DS) 10 mg dose pack   2. Muscle spasm M62.838 728.85 methocarbamol (ROBAXIN) 500 mg tablet     I have discussed the diagnosis with the patient and the intended plan of care as seen in the above orders. The patient has received an after-visit summary and questions were answered concerning future plans. I have discussed medication, side effects, and warnings with the patient in detail. The patient verbalized understanding and is in agreement with the plan of care. The patient will contact the office with any additional concerns. Follow-up Disposition:  Return in about 2 weeks (around 8/8/2018).   lab results and schedule of future lab studies reviewed with patient    Dougie Dennis MD

## 2018-07-25 NOTE — MR AVS SNAPSHOT
[facility] [address] [place] [address] Road 
[phone] Patient: Marielos Martinez MRN: [id number] :1975 Visit Information Date & Time Provider Department Dept. Phone Encounter #  
 2018 10:45 AM Santosh Mendez MD [facility] [phone] [id number] Follow-up Instructions Return in about 2 weeks (around 2018). Upcoming Health Maintenance Date Due  
 PAP AKA CERVICAL CYTOLOGY 10/6/1998 Influenza Age 5 to Adult 2018 DTaP/Tdap/Td series (3 - Td) 2028 Allergies as of 2018  Review Complete On: 2018 By: Santosh Mendez MD  
 No Known Allergies Current Immunizations  Never Reviewed Name Date Tdap 2009 12:00 AM  
  
 Not reviewed this visit You Were Diagnosed With   
  
 Codes Comments Neck pain    -  Primary ICD-10-CM: M54.2 ICD-9-CM: 723.1 Muscle spasm     ICD-10-CM: L12.850 ICD-9-CM: 728.85 Vitals BP Pulse Temp Resp Height(growth percentile) Weight(growth percentile) 128/70 78 98 °F (36.7 °C) 15 5' 6\" (1.676 m) 181 lb (82.1 kg) SpO2 BMI OB Status Smoking Status 98% 29.21 kg/m2 Unknown Never Smoker Vitals History BMI and BSA Data Body Mass Index Body Surface Area  
 29.21 kg/m 2 1.96 m 2 Preferred Pharmacy Pharm[facility] Name Phone [facility] [address] [address] [facility] [phone] Your Updated Medication List  
  
   
This list is accurate as of 18 11:08 AM.  Always use your most recent med list.  
  
  
  
  
 buPROPion  mg tablet Commonly known as:  Jorge April Take 1 Tab by mouth every morning. IMITREX 50 mg tablet Generic drug:  SUMAtriptan Take 50 mg by mouth once as needed for Migraine. meloxicam 15 mg tablet Commonly known as:  MOBIC Take 15 mg by mouth daily. methocarbamol 500 mg tablet Commonly known as:  ROBAXIN Take 1 Tab by mouth four (4) times daily. predniSONE 10 mg dose pack Commonly known as:  STERAPRED DS See administration instruction per 10mg dose pack VOLTAREN 1 % Gel Generic drug:  diclofenac Apply  to affected area four (4) times daily. Prescriptions Sent to Pharmacy Refills  
 methocarbamol (ROBAXIN) 500 mg tablet 1 Sig: Take 1 Tab by mouth four (4) times daily. Class: Normal  
 Pharmacy: [id number] [place]riola Ph #: [phone] Route: Oral  
 predniSONE (STERAPRED DS) 10 mg dose pack 0 Sig: See administration instruction per 10mg dose pack Class: Normal  
 [id number] [address] [place] Ph #: [phone] Follow-up Instructions Return in about 2 weeks (around 8/8/2018). Patient Instructions Neck Spasm: Exercises Your Care Instructions Here are some examples of typical rehabilitation exercises for your condition. Start each exercise slowly. Ease off the exercise if you start to have pain. Your doctor or physical therapist will tell you when you can start these exercises and which ones will work best for you. How to do the exercises Levator scapula stretch 1. Sit in a firm chair, or stand up straight. 2. Gently tilt your head toward your left shoulder. 3. Turn your head to look down into your armpit, bending your head slightly forward. Let the weight of your head stretch your neck muscles. 4. Hold for 15 to 30 seconds. 5. Return to your starting position. 6. Follow the same instructions above, but tilt your head toward your right shoulder. 7. Repeat 2 to 4 times toward each shoulder. Upper trapezius stretch 1. Sit in a firm chair, or stand up straight.  
2. This stretch works best if you keep your shoulder down as you lean away from it. To help you remember to do this, start by relaxing your shoulders and lightly holding on to your thighs or your chair. 3. Tilt your head toward your shoulder and hold for 15 to 30 seconds. Let the weight of your head stretch your muscles. 4. If you would like a little added stretch, place your arm behind your back. Use the arm opposite of the direction you are tilting your head. For example, if you are tilting your head to the left, place your right arm behind your back. 5. Repeat 2 to 4 times toward each shoulder. Neck rotation 1. Sit in a firm chair, or stand up straight. 2. Keeping your chin level, turn your head to the right, and hold for 15 to 30 seconds. 3. Turn your head to the left, and hold for 15 to 30 seconds. 4. Repeat 2 to 4 times to each side. Chin tuck 1. Lie on the floor with a rolled-up towel under your neck. Your head should be touching the floor. 2. Slowly bring your chin toward the front of your neck. 3. Hold for a count of 6, and then relax for up to 10 seconds. 4. Repeat 8 to 12 times. Forward neck flexion 1. Sit in a firm chair, or stand up straight. 2. Bend your head forward. 3. Hold for 15 to 30 seconds, then return to your starting position. 4. Repeat 2 to 4 times. Follow-up care is a key part of your treatment and safety. Be sure to make and go to all appointments, and call your doctor if you are having problems. It's also a good idea to know your test results and keep a list of the medicines you take. Where can you learn more? Go to http://kerri-kulwant.info/. Enter P962 in the search box to learn more about \"Neck Spasm: Exercises. \" Current as of: November 29, 2017 Content Version: 11.7 © 0524-8270 Selero, Incorporated. Care instructions adapted under license by Sokoos (which disclaims liability or warranty for this information).  If you have questions about a medical condition or this instruction, always ask your healthcare professional. [address] any warranty or liability for your use of this information. Introducing [facility] & HEALTH SERVICES! Dear Vivien Patiño: Thank you for requesting a Netsket account. Our records indicate that you already have an active Netsket account. You can access your account anytime at [email or web address] Did you know that you can access your hospital and ER discharge instructions at any time in Netsket? You can also review all of your test results from your hospital stay or ER visit. Additional Information If you have questions, please visit the Frequently Asked Questions section of the Netsket website at [email or web address] Remember, Netsket is NOT to be used for urgent needs. For medical emergencies, dial 911. Now available from your iPhone and Android! Please provide this summary of care documentation to your next provider. Your primary care clinician is listed as [id number] [facility]. If you have any questions after today's visit, please call [phone].

## 2018-08-09 ENCOUNTER — OFFICE VISIT (OUTPATIENT)
Dept: FAMILY MEDICINE CLINIC | Age: 43
End: 2018-08-09

## 2018-08-09 VITALS
SYSTOLIC BLOOD PRESSURE: 120 MMHG | OXYGEN SATURATION: 98 % | HEIGHT: 66 IN | HEART RATE: 68 BPM | BODY MASS INDEX: 28.93 KG/M2 | TEMPERATURE: 98 F | DIASTOLIC BLOOD PRESSURE: 68 MMHG | WEIGHT: 180 LBS | RESPIRATION RATE: 15 BRPM

## 2018-08-09 DIAGNOSIS — M62.838 MUSCLE SPASMS OF NECK: Primary | ICD-10-CM

## 2018-08-09 RX ORDER — CLONAZEPAM 1 MG/1
1 TABLET ORAL 2 TIMES DAILY
Qty: 30 TAB | Refills: 0 | Status: SHIPPED | OUTPATIENT
Start: 2018-08-09

## 2018-08-09 NOTE — PATIENT INSTRUCTIONS
Neck Spasm: Care Instructions  Your Care Instructions  A neck spasm is sudden tightness and pain in your neck muscles. A spasm may be caused by some activities or repeated movements. For example, you may be more likely to have a neck spasm if you slouch, paint a ceiling, work at a computer, or sleep with your neck twisted. But the cause isn't always clear. Home treatment includes using heat or ice, taking over-the-counter (OTC) pain medicines, and avoiding activities that may lead to neck pain. Gentle stretching, or treatments such as massage or manipulation, may also help ease a neck spasm. For a neck spasm that doesn't get better with home care, your doctor may prescribe medicine. He or she may also suggest exercise or physical therapy to help strengthen or relax your neck muscles. Follow-up care is a key part of your treatment and safety. Be sure to make and go to all appointments, and call your doctor if you are having problems. It's also a good idea to know your test results and keep a list of the medicines you take. How can you care for yourself at home? · To relieve pain, use heat or ice (whichever feels better) on the affected area. ¨ Put a warm water bottle, a heating pad set on low, or a warm cloth on your neck. Put a thin cloth between the heating pad and your skin. Do not go to sleep with a heating pad on your skin. ¨ Try ice or a cold pack on the area for 10 to 20 minutes at a time. Put a thin cloth between the ice and your skin. · Ask your doctor if you can take acetaminophen (such as Tylenol) or nonsteroidal anti-inflammatory drugs, such as ibuprofen or naproxen. Your doctor can prescribe stronger medicines if needed. Be safe with medicines. Read and follow all instructions on the label. · Stretch your muscles every day, especially before and after exercise and at bedtime. Regular stretching can help relax your muscles.   · Try to find a pillow and a position in bed that help improve your night's rest.  · Try to stay active. It's best to start activity slowly. If an exercise makes your painworse, stop doing it. When should you call for help? Call 911 anytime you think you may need emergency care. For example, call if:    · You are unable to move an arm or a leg at all.   [facility] your doctor now or seek immediate medical care if:    · You have new or worse symptoms in your arms, legs, belly, or buttocks. Symptoms may include:  ¨ Numbness or tingling. ¨ Weakness. ¨ Pain.     · You lose bladder or bowel control.    Watch closely for changes in your health, and be sure to contact your doctor if:    · You do not get better as expected. Where can you learn more? Go to http://kerri-kulwant.info/. Enter J169 in the search box to learn more about \"Neck Spasm: Care Instructions. \"  Current as of: November 29, 2017  Content Version: 11.7  © 2006-0951 littleBits Electronics, Incorporated. Care instructions adapted under license by Education Elements (which disclaims liability or warranty for this information). If you have questions about a medical condition or this instruction, always ask your healthcare professional. [place] [address] any warranty or liability for your use of this information.

## 2018-08-09 NOTE — MR AVS SNAPSHOT
[facility] [address] [place] [address] 
[phone] Patient: Escobar Harper MRN: [id number] :1975 Visit Information Date & Time Provider Department Dept. Phone Encounter #  
 2018  4:30 PM Sadie Hu MD [facility] [phone] [id number] Follow-up Instructions Return if symptoms worsen or fail to improve. Upcoming Health Maintenance Date Due  
 PAP AKA CERVICAL CYTOLOGY 10/6/1998 Influenza Age 5 to Adult 2018 DTaP/Tdap/Td series (3 - Td) 2028 Allergies as of 2018  Review Complete On: 2018 By: Sadie Hu MD  
 No Known Allergies Current Immunizations  Never Reviewed Name Date Tdap 2009 12:00 AM  
  
 Not reviewed this visit You Were Diagnosed With   
  
 Codes Comments Muscle spasms of neck    -  Primary ICD-10-CM: J02.164 ICD-9-CM: 728.85 Vitals BP Pulse Temp Resp Height(growth percentile) Weight(growth percentile) 120/68 68 98 °F (36.7 °C) 15 5' 6\" (1.676 m) 180 lb (81.6 kg) LMP SpO2 BMI OB Status Smoking Status 2018 98% 29.05 kg/m2 Unknown Never Smoker Vitals History BMI and BSA Data Body Mass Index Body Surface Area 29.05 kg/m 2 1.95 m 2 Preferred Pharmacy Pharmacy Name Phone [facility] [address], [address] [facility] [phone] Your Updated Medication List  
  
   
This list is accurate as of 18  4:47 PM.  Always use your most recent med list.  
  
  
  
  
 buPROPion  mg tablet Commonly known as:  Johnita Plump Take 1 Tab by mouth every morning. clonazePAM 1 mg tablet Commonly known as:  Flonnjavon Sables Take 1 Tab by mouth two (2) times a day. Max Daily Amount: 2 mg. IMITREX 50 mg tablet Generic drug:  SUMAtriptan Take 50 mg by mouth once as needed for Migraine. meloxicam 15 mg tablet Commonly known as:  MOBIC Take 15 mg by mouth daily. methocarbamol 500 mg tablet Commonly known as:  ROBAXIN Take 1 Tab by mouth four (4) times daily. predniSONE 10 mg dose pack Commonly known as:  STERAPRED DS See administration instruction per 10mg dose pack VOLTAREN 1 % Gel Generic drug:  diclofenac Apply  to affected area four (4) times daily. Prescriptions Printed Refills  
 clonazePAM (KLONOPIN) 1 mg tablet 0 Sig: Take 1 Tab by mouth two (2) times a day. Max Daily Amount: 2 mg. Class: Print Route: Oral  
  
We Performed the Following REFERRAL TO SPINE SURGERY [JXY682 Custom] Comments:  
 Please evaluate patient for neck spasms with history of C5 C6 disc bulge Follow-up Instructions Return if symptoms worsen or fail to improve. Referral Information Referral ID Referred By Referred To  
  
 [id number] JUNAID Peraza Not Available Visits Status Start Date End Date 1 New Request 8/9/18 8/9/19 If your referral has a status of pending review or denied, additional information will be sent to support the outcome of this decision. Patient Instructions Neck Spasm: Care Instructions Your Care Instructions A neck spasm is sudden tightness and pain in your neck muscles. A spasm may be caused by some activities or repeated movements. For example, you may be more likely to have a neck spasm if you slouch, paint a ceiling, work at a computer, or sleep with your neck twisted. But the cause isn't always clear. Home treatment includes using heat or ice, taking over-the-counter (OTC) pain medicines, and avoiding activities that may lead to neck pain. Gentle stretching, or treatments such as massage or manipulation, may also help ease a neck spasm. For a neck spasm that doesn't get better with home care, your doctor may prescribe medicine.  He or she may also suggest exercise or physical therapy to help strengthen or relax your neck muscles. Follow-up care is a key part of your treatment and safety. Be sure to make and go to all appointments, and call your doctor if you are having problems. It's also a good idea to know your test results and keep a list of the medicines you take. How can you care for yourself at home? · To relieve pain, use heat or ice (whichever feels better) on the affected area. ¨ Put a warm water bottle, a heating pad set on low, or a warm cloth on your neck. Put a thin cloth between the heating pad and your skin. Do not go to sleep with a heating pad on your skin. ¨ Try ice or a cold pack on the area for 10 to 20 minutes at a time. Put a thin cloth between the ice and your skin. · Ask your doctor if you can take acetaminophen (such as Tylenol) or nonsteroidal anti-inflammatory drugs, such as ibuprofen or naproxen. Your doctor can prescribe stronger medicines if needed. Be safe with medicines. Read and follow all instructions on the label. · Stretch your muscles every day, especially before and after exercise and at bedtime. Regular stretching can help relax your muscles. · Try to find a pillow and a position in bed that help improve your night's rest. 
· Try to stay active. It's best to start activity slowly. If an exercise makes your painworse, stop doing it. When should you call for help? Call 911 anytime you think you may need emergency care. For example, call if: 
  · You are unable to move an arm or a leg at all.  
[facility] your doctor now or seek immediate medical care if: 
  · You have new or worse symptoms in your arms, legs, belly, or buttocks. Symptoms may include: ¨ Numbness or tingling. ¨ Weakness. ¨ Pain.  
  · You lose bladder or bowel control.  
 Watch closely for changes in your health, and be sure to contact your doctor if: 
  · You do not get better as expected. Where can you learn more? Go to http://kerri-kulwant.info/. Enter H175 in the search box to learn more about \"Neck Spasm: Care Instructions. \" Current as of: November 29, 2017 Content Version: 11.7 © 5918-9716 Altitude Digital, Chronon Systems. Care instructions adapted under license by PanAtlanta (which disclaims liability or warranty for this information). If you have questions about a medical condition or this instruction, always ask your healthcare professional. [address] any warranty or liability for your use of this information. Introducing [facility] & HEALTH SERVICES! Dear Britt Arrington: Thank you for requesting a MD2U account. Our records indicate that you already have an active MD2U account. You can access your account anytime at [email or web address] Did you know that you can access your hospital and ER discharge instructions at any time in MD2U? You can also review all of your test results from your hospital stay or ER visit. Additional Information If you have questions, please visit the Frequently Asked Questions section of the MD2U website at [email or web address] Remember, MD2U is NOT to be used for urgent needs. For medical emergencies, dial 911. Now available from your iPhone and Android! Please provide this summary of care documentation to your next provider. Your primary care clinician is listed as [address]. If you have any questions after today's visit, please call [phone].

## 2018-08-09 NOTE — PROGRESS NOTES
Hina Gurrola is a 38 y.o. female  presents for headache for past 2 days. Worse when she looks up and to the left. She has assoc neck spasms. No fever chills weakness or numbness. Neck sxs are getting better. No Known Allergies    There is no problem list on file for this patient. Past Medical History:   Diagnosis Date    ADHD     Anxiety     Back pain     Depression     Migraine     Neck pain      Social History     Social History    Marital status:      Spouse name: N/A    Number of children: N/A    Years of education: N/A     Social History Main Topics    Smoking status: Never Smoker    Smokeless tobacco: Never Used    Alcohol use Yes      Comment: 'rarely'    Drug use: No    Sexual activity: Yes     Other Topics Concern    None     Social History Narrative     Family History   Problem Relation Age of Onset    Sleep Apnea Mother     Heart Disease Mother     High Cholesterol Mother     Muscular dystrophy Father     High Cholesterol Father     Diabetes Other         Review of Systems   Constitutional: Negative for chills, fever, malaise/fatigue and weight loss. Eyes: Negative for blurred vision. Respiratory: Negative for shortness of breath and wheezing. Cardiovascular: Negative for chest pain and palpitations. Gastrointestinal: Negative for nausea and vomiting. Musculoskeletal: Positive for neck pain. Negative for back pain, joint pain and myalgias. Skin: Negative for rash. Neurological: Positive for headaches. Negative for tingling, tremors, sensory change, speech change, focal weakness and weakness. Psychiatric/Behavioral: Negative.         Vitals:    08/09/18 1627   BP: 120/68   Pulse: 68   Resp: 15   Temp: 98 °F (36.7 °C)   SpO2: 98%   Weight: 180 lb (81.6 kg)   Height: 5' 6\" (1.676 m)   PainSc:   6   PainLoc: Neck   LMP: 08/01/2018       Physical Exam   Constitutional: She is oriented to person, place, and time and well-developed, well-nourished, and in no distress. Cardiovascular: Normal rate, regular rhythm and normal heart sounds. Pulmonary/Chest: Effort normal and breath sounds normal.   Musculoskeletal: She exhibits tenderness (left side of neck and left shoulder. no weakness or numbness). She exhibits no edema. Right shoulder: She exhibits decreased range of motion. She exhibits no tenderness. Neurological: She is alert and oriented to person, place, and time. Skin: Skin is warm and dry. Nursing note and vitals reviewed. Assessment/Plan      ICD-10-CM ICD-9-CM    1. Muscle spasms of neck M62.838 728. 85 clonazePAM (KLONOPIN) 1 mg tablet      REFERRAL TO SPINE SURGERY     I have discussed the diagnosis with the patient and the intended plan of care as seen in the above orders. The patient has received an after-visit summary and questions were answered concerning future plans. I have discussed medication, side effects, and warnings with the patient in detail. The patient verbalized understanding and is in agreement with the plan of care. The patient will contact the office with any additional concerns.       Follow-up Disposition:  Return if symptoms worsen or fail to improve.  lab results and schedule of future lab studies reviewed with patient    Celine Ramirez MD

## 2018-08-09 NOTE — PROGRESS NOTES
Chief Complaint   Patient presents with    Neck Pain    Headache     1. Have you been to the ER, urgent care clinic since your last visit? Hospitalized since your last visit? No    2. Have you seen or consulted any other health care providers outside of the [address] [place] [address] since your last visit? Include any pap smears or colon screening. No     Pt c/o neck pain not getting better. Has had HA x 2 days.

## 2018-09-12 ENCOUNTER — OFFICE VISIT (OUTPATIENT)
Dept: ORTHOPEDIC SURGERY | Age: 43
End: 2018-09-12

## 2018-09-12 VITALS
OXYGEN SATURATION: 100 % | HEIGHT: 66 IN | BODY MASS INDEX: 28.93 KG/M2 | SYSTOLIC BLOOD PRESSURE: 129 MMHG | DIASTOLIC BLOOD PRESSURE: 69 MMHG | WEIGHT: 180 LBS | HEART RATE: 70 BPM

## 2018-09-12 DIAGNOSIS — M47.812 CERVICAL SPONDYLOSIS WITHOUT MYELOPATHY: ICD-10-CM

## 2018-09-12 DIAGNOSIS — M50.30 DDD (DEGENERATIVE DISC DISEASE), CERVICAL: ICD-10-CM

## 2018-09-12 DIAGNOSIS — M54.2 NECK PAIN: Primary | ICD-10-CM

## 2018-09-12 DIAGNOSIS — M54.12 CERVICAL NEURITIS: ICD-10-CM

## 2018-09-12 NOTE — PROGRESS NOTES
[facility]  [address], [address]  Phone: [phone]  Fax: [phone]        INITIAL CONSULTATION      HISTORY OF PRESENT ILLNESS:  Yeny Colunga is a 38 y.o. female whom is referred from Atilio Galloway MD secondary to chronic neck pain with intermittent paraesthesias radiating into the BUE to the elbow x 5-6 years. She additionally endorses chronic entire spine pain, which she reports is worst between her shoulder blades also x 5-6 years. She describes a left foot drop. She rates her pain 8/10. She is completing her HEP x 3/week but reports it is mostly stretching. She reports she had a cervical MRI in 2015 but did not bring films. Her BUE paraesthesias start when her arms are raised. She denies hx of spinal surgery or injections. She attended chiropractic treatment in 2011 and PT in 2015. She took MDP with no relief. She took Robaxin with no relief. She took Mobic and Klonopin and Voltaren gel. Pt denies fever, weight loss, or skin changes. Pt denies dropping things or loss of balance. Pt denies change in bowel or bladder habits. She denies possibility of pregnancy or breastfeeding. ER note dated 6/9/18 indicating patient presented for right flank pain. Hx of kidney stones. Note from Atilio Galloway MD dated 8/9/18 indicating patient was seen with c/o headaches x 2 days. Worsened by looking up or to the left. Associated with neck spasms. She was treated with Klonopin. The patient is RHD.  reviewed. Body mass index is 29.05 kg/(m^2).     PCP: Atilio Galloway MD    Past Medical History:   Diagnosis Date    ADHD     Anxiety     Back pain     Depression     Migraine     Neck pain           Past Surgical History:   Procedure Laterality Date    HX APPENDECTOMY      HX HERNIA REPAIR      no mesh    HX OTHER SURGICAL      fatty tumors          Social History   Substance Use Topics    Smoking status: Never Smoker    Smokeless tobacco: Never Used   [employer] Alcohol use Yes      Comment: 'rarely'     Work status: The patient is employed. Marital status: The patient is . Current Outpatient Prescriptions   Medication Sig Dispense Refill    clonazePAM (KLONOPIN) 1 mg tablet Take 1 Tab by mouth two (2) times a day. Max Daily Amount: 2 mg. 30 Tab 0    diclofenac (VOLTAREN) 1 % gel Apply  to affected area four (4) times daily.  methocarbamol (ROBAXIN) 500 mg tablet Take 1 Tab by mouth four (4) times daily. 40 Tab 1    predniSONE (STERAPRED DS) 10 mg dose pack See administration instruction per 10mg dose pack 21 Tab 0    SUMAtriptan (IMITREX) 50 mg tablet Take 50 mg by mouth once as needed for Migraine.  meloxicam (MOBIC) 15 mg tablet Take 15 mg by mouth daily.  buPROPion XL (WELLBUTRIN XL) 150 mg tablet Take 1 Tab by mouth every morning. 27 Tab 2       No Known Allergies         Family History   Problem Relation Age of Onset    Sleep Apnea Mother     Heart Disease Mother     High Cholesterol Mother     Muscular dystrophy Father     High Cholesterol Father     Diabetes Other          REVIEW OF SYSTEMS  Constitutional symptoms: Negative  Eyes: Negative  Ears, Nose, Throat, and Mouth: Negative  Cardiovascular: Negative  Respiratory: Negative  Genitourinary: Negative  Integumentary (Skin and/or breast): Negative  Musculoskeletal: Positive for entire spine pain, neck pain, BUE paraesthesias  Extremities: Negative for edema. Endocrine/Rheumatologic: Negative  Hematologic/Lymphatic: Negative  Allergic/Immunologic: Negative  Psychiatric: Negative       PHYSICAL EXAMINATION    Visit Vitals    /69    Pulse 70    Ht 5' 6\" (1.676 m)    Wt 81.6 kg (180 lb)    SpO2 100%    BMI 29.05 kg/m2       CONSTITUTIONAL: NAD, A&O x 3  HEART: Regular rate and rhythm  ABDOMEN: Positive bowel sounds, soft, nontender, and nondistended  LUNGS: Clear to auscultation bilaterally. SKIN: Negative for rash.   RANGE OF MOTION: The patient has full passive range of motion in all four extremities. SENSATION: Sensation is intact to light touch throughout. MOTOR:   Straight Leg Raise: Negative, bilateral  Smith: Negative, bilateral  Tandem Gait: Neg. Deep tendon reflexes are 0 at the brachioradialis, 1+ at the biceps, and triceps bilaterally. Deep tendon reflexes are 1+ at the knees and 2+ at the ankles bilaterally. Shoulder AB/Flex Elbow Flex Wrist Ext Elbow Ext Wrist Flex Hand Intrin Tone   Right +4/5 +4/5 +4/5 +4/5 +4/5 +4/5 +4/5   Left +4/5 +4/5 +4/5 +4/5 +4/5 +4/5 +4/5              Hip Flex Knee Ext Knee Flex Ankle DF GTE Ankle PF Tone   Right +4/5 +4/5 +4/5 +4/5 +4/5 +4/5 +4/5   Left +4/5 +4/5 +4/5 +4/5 +4/5 +4/5 +4/5     RADIOGRAPHS  Preliminary reading of cervical plain films:  Nonspecific straightening of the cervical spine. Mild degenerative changes at C5/6. No acute pathology identified. These are being sent out for official reading by Dr. Home Barrow. ASSESSMENT   Diagnoses and all orders for this visit:    1. Neck pain  -     [87269] C Spine 2-3V  -     REFERRAL TO PHYSICAL THERAPY    2. DDD (degenerative disc disease), cervical  -     REFERRAL TO PHYSICAL THERAPY    3. Cervical neuritis  -     REFERRAL TO PHYSICAL THERAPY    4. Cervical spondylosis without myelopathy  -     REFERRAL TO PHYSICAL THERAPY           IMPRESSIONS/RECOMMENDATIONS:  Patient presents with chronic entire spine pain. She was referred for chronic neck pain with intermittent paraesthesias radiating into the BUE to the elbow. I will refer her to physical therapy for her cervical spine with an emphasis on HEP. I offered to try her on a neuropathic pain medication but she declined. Patient is neurologically intact. Multiple treatment options were discussed. I recommend she increase the frequency of her HEP to daily. Patient is not interested in surgical intervention medications at this time. I will see the patient back in 1 month's time or earlier if needed.         Written by Vic Joy, as dictated by Yumiko Alfaro MD  I examined the patient, reviewed and agree with the note.

## 2018-09-12 NOTE — MR AVS SNAPSHOT
[address] [facility], [place] 
 
 
 [address] [facility] 
[phone] Patient: Cesar Segura MRN: [id number] :1975 Visit Information Date & Time Provider Department Dept. Phone Encounter #  
 2018 10:00 AM Xiomara Washington MD [facility] [place] [phone] Follow-up Instructions Return in about 4 weeks (around 10/10/2018). Upcoming Health Maintenance Date Due  
 PAP AKA CERVICAL CYTOLOGY 10/6/1998 Influenza Age 5 to Adult 2018 DTaP/Tdap/Td series (3 - Td) 2028 Allergies as of 2018  Review Complete On: 2018 By: Xiomara Washington MD  
 No Known Allergies Current Immunizations  Never Reviewed Name Date Tdap 2009 12:00 AM  
  
 Not reviewed this visit You Were Diagnosed With   
  
 Codes Comments Neck pain    -  Primary ICD-10-CM: M54.2 ICD-9-CM: 723.1 DDD (degenerative disc disease), cervical     ICD-10-CM: M50.30 ICD-9-CM: 722.4 Cervical neuritis     ICD-10-CM: M54.12 
ICD-9-CM: 723.4 Cervical spondylosis without myelopathy     ICD-10-CM: K49.005 ICD-9-CM: 721.0 Vitals BP Pulse Height(growth percentile) Weight(growth percentile) SpO2 BMI  
 129/69 70 5' 6\" (1.676 m) 180 lb (81.6 kg) 100% 29.05 kg/m2 OB Status Smoking Status Unknown Never Smoker BMI and BSA Data Body Mass Index Body Surface Area 29.05 kg/m 2 1.95 m 2 Preferred Pharmacy Pharmacy Name Phone [facility] [address], [address] [facility] [phone] Your Updated Medication List  
  
   
This list is accurate as of 18 12:10 PM.  Always use your most recent med list.  
  
  
  
  
 buPROPion  mg tablet Commonly known as:  Jamas Messenger Take 1 Tab by mouth every morning. clonazePAM 1 mg tablet Commonly known as:  Licha Patch  
 Take 1 Tab by mouth two (2) times a day. Max Daily Amount: 2 mg. IMITREX 50 mg tablet Generic drug:  SUMAtriptan Take 50 mg by mouth once as needed for Migraine. meloxicam 15 mg tablet Commonly known as:  MOBIC Take 15 mg by mouth daily. methocarbamol 500 mg tablet Commonly known as:  ROBAXIN Take 1 Tab by mouth four (4) times daily. predniSONE 10 mg dose pack Commonly known as:  STERAPRED DS See administration instruction per 10mg dose pack VOLTAREN 1 % Gel Generic drug:  diclofenac Apply  to affected area four (4) times daily. We Performed the Following AMB POC XRAY, SPINE, CERVICAL; 2 OR 3 [97526 CPT(R)] REFERRAL TO PHYSICAL THERAPY [GIL56 Custom] Comments:  
 DX:Cervical to BUE Eval and Treat HEP 
LOCATION:[facility]3 visits/ 2-3 weeks Follow-up Instructions Return in about 4 weeks (around 10/10/2018). Referral Information Referral ID Referred By Referred To  
  
 [id number] MIGEL DAVIS III Not Available Visits Status Start Date End Date 1 New Request 9/12/18 9/12/19 If your referral has a status of pending review or denied, additional information will be sent to support the outcome of this decision. Introducing [facility]! Dear Alessia Means: Thank you for requesting a [employer] account. Our records indicate that you already have an active [employer] account. You can access your account anytime at https://[employer] Information/[employer] Did you know that you can access your hospital and ER discharge instructions at any time in [employer]? You can also review all of your test results from your hospital stay or ER visit. Additional Information If you have questions, please visit the Frequently Asked Questions section of the True [employer] website at https://[employer]. Remember, [employer] is NOT to be used for urgent needs. For medical emergencies, dial 911. Now available from your iPhone and Android! Please provide this summary of care documentation to your next provider. Your primary care clinician is listed as [address] [place]. If you have any questions after today's visit, please call [phone].

## 2018-09-24 ENCOUNTER — APPOINTMENT (OUTPATIENT)
Dept: PHYSICAL THERAPY | Age: 43
End: 2018-09-24

## 2022-08-10 NOTE — PATIENT INSTRUCTIONS
Chief Complaint   Patient presents with    Follow-up    Memory Loss     Cognitive decline frequent burst of anger     1. Have you been to the ER, urgent care clinic since your last visit? Yes Hospitalized since your last visit? Yes    2. Have you seen or consulted any other health care providers outside of the [address] [place] [address] since your last visit? Yes Include any pap smears or colon screening. Patient C/O feeling not wanted by son . Estela Espino states she refuses medications at times. Uvulitis: Care Instructions  Your Care Instructions  Uvulitis (say \"kwl-ssoy-US-tus\") is an inflammation of the uvula (say \"RAV-llht-xzc\"). This is the small piece of finger-shaped tissue that hangs down in the back of the throat. Uvulitis is most often caused by an infection. It can also be a reaction to an allergy or injury. Often the cause is not known. Your uvula may be red and swollen. You may feel like something is stuck at the back of your throat. Sometimes you may have a hard time swallowing. You may have a sore throat or a fever. Home treatment for a sore throat may be all that is needed to relieve uvulitis. If it is caused by an infection, your doctor may give you an antibiotic. Your doctor may prescribe an antihistamine or a steroid medicine if uvulitis is caused by an allergy. It may also go away without treatment. Follow-up care is a key part of your treatment and safety. Be sure to make and go to all appointments, and call your doctor if you are having problems. It's also a good idea to know your test results and keep a list of the medicines you take. How can you care for yourself at home? · Be safe with medicines. Take your medicines exactly as prescribed. Call your doctor if you think you are having a problem with your medicine. You will get more details on the specific medicines your doctor prescribes. · Gargle with warm salt water once an hour to help reduce swelling and relieve pain. Use 1 teaspoon of salt mixed in 1 cup of warm water. · Try an over-the-counter throat spray to relieve throat pain. · Ask your doctor if you can take an over-the-counter pain medicine, such as acetaminophen (Tylenol), ibuprofen (Advil, Motrin), or naproxen (Aleve). Read and follow all instructions on the label. · Drink plenty of fluids. Fluids may help soothe your throat.  If you have kidney, heart, or liver disease and have to limit fluids, talk with your doctor before you increase the amount of fluids you drink. · Do not smoke or allow others to smoke around you. Smoking can make your throat problem worse. If you need help quitting, talk to your doctor about stop-smoking programs and medicines. These can increase your chances of quitting for good. When should you call for help? Call your doctor now or seek immediate medical care if:  ? · You have new or worse symptoms of infection, such as:  ¨ Increased pain, swelling, warmth, or redness. ¨ Red streaks leading from the area. ¨ Pus draining from the area. ¨ A fever. ? · You have new pain, or your pain gets worse. ? · You have new or worse trouble swallowing. ? · You seem to be getting sicker. ? · You have shortness of breath. ? Watch closely for changes in your health, and be sure to contact your doctor if:  ? · You do not get better as expected. Where can you learn more? Go to http://kerri-kulwant.info/. Enter R667 in the search box to learn more about \"Uvulitis: Care Instructions. \"  Current as of: May 12, 2017  Content Version: 11.4  © 9663-3411 [employer]. Care instructions adapted under license by [employer] (which disclaims liability or warranty for this information). If you have questions about a medical condition or this instruction, always ask your healthcare professional. [address] any warranty or liability for your use of this information.